# Patient Record
Sex: MALE | Race: WHITE | NOT HISPANIC OR LATINO | Employment: FULL TIME | URBAN - METROPOLITAN AREA
[De-identification: names, ages, dates, MRNs, and addresses within clinical notes are randomized per-mention and may not be internally consistent; named-entity substitution may affect disease eponyms.]

---

## 2020-10-28 ENCOUNTER — CLINICAL SUPPORT (OUTPATIENT)
Dept: OTHER | Facility: CLINIC | Age: 30
End: 2020-10-28
Payer: COMMERCIAL

## 2020-10-28 DIAGNOSIS — Z00.8 ENCOUNTER FOR OTHER GENERAL EXAMINATION: ICD-10-CM

## 2020-10-29 VITALS — HEIGHT: 70 IN

## 2020-10-29 LAB
GLUCOSE SERPL-MCNC: 106 MG/DL (ref 60–140)
POC CHOLESTEROL, TOTAL: 177 MG/DL
TRIGL SERPL-MCNC: 651 MG/DL

## 2020-11-13 ENCOUNTER — TELEPHONE (OUTPATIENT)
Dept: OTHER | Facility: CLINIC | Age: 30
End: 2020-11-13

## 2020-12-09 ENCOUNTER — TELEPHONE (OUTPATIENT)
Dept: OTHER | Facility: CLINIC | Age: 30
End: 2020-12-09

## 2021-10-29 ENCOUNTER — CLINICAL SUPPORT (OUTPATIENT)
Dept: OTHER | Facility: CLINIC | Age: 31
End: 2021-10-29
Payer: COMMERCIAL

## 2021-10-29 DIAGNOSIS — Z00.8 ENCOUNTER FOR OTHER GENERAL EXAMINATION: ICD-10-CM

## 2021-10-30 VITALS — HEIGHT: 70 IN

## 2021-10-30 LAB
GLUCOSE SERPL-MCNC: 104 MG/DL (ref 60–140)
HDLC SERPL-MCNC: 18 MG/DL
POC CHOLESTEROL, TOTAL: 160 MG/DL
TRIGL SERPL-MCNC: 617 MG/DL

## 2021-12-27 ENCOUNTER — TELEPHONE (OUTPATIENT)
Dept: OTHER | Facility: CLINIC | Age: 31
End: 2021-12-27
Payer: COMMERCIAL

## 2022-02-08 ENCOUNTER — TELEPHONE (OUTPATIENT)
Dept: OTHER | Facility: CLINIC | Age: 32
End: 2022-02-08
Payer: COMMERCIAL

## 2022-02-08 NOTE — TELEPHONE ENCOUNTER
Patient returned call and said he went to PCP regarding abnormal labs and is now on medication.    #2 attempt to contact patient on behalf of [employer] to discuss test results.. No answer. Left message.

## 2022-04-06 ENCOUNTER — LAB VISIT (OUTPATIENT)
Dept: LAB | Facility: HOSPITAL | Age: 32
End: 2022-04-06
Attending: EMERGENCY MEDICINE

## 2022-04-06 DIAGNOSIS — Z22.7 INACTIVE TUBERCULOSIS: Primary | ICD-10-CM

## 2022-04-06 PROCEDURE — 86480 TB TEST CELL IMMUN MEASURE: CPT | Performed by: EMERGENCY MEDICINE

## 2022-04-11 LAB — MAYO MISCELLANEOUS RESULT (REF): NORMAL
